# Patient Record
(demographics unavailable — no encounter records)

---

## 2024-10-16 NOTE — ASSESSMENT
[FreeTextEntry1] : holter with symptomatic PVC hold off on further meds for now they are rare  cad on statin atorvastatin 80 mg daily afib repeat 14 day holter monitor  HTN on losartan 50 mg daily  hold losartan morning of surgery take metoprolol morning of and aspirin  fu in three months Medical Necessity Information: It is in your best interest to select a reason for this procedure from the list below. All of these items fulfill various CMS LCD requirements except the new and changing color options. Medical Necessity Clause: This procedure was medically necessary because the lesion that was treated was: Lab: [id number] [facility] Body Location Override (Optional - Billing Will Still Be Based On Selected Body Map Location If Applicable): right ankle Detail Level: Detailed Was A Bandage Applied: Yes Size Of Lesion In Cm (Required): 1.2 X Size Of Lesion In Cm (Optional): 0 Biopsy Method: Personna blade Anesthesia Type: 1% lidocaine with epinephrine Anesthesia Volume In Cc: 0.7 Hemostasis: Electrocautery Wound Care: Bacitracin Path Notes (To The Dermatopathologist): Size: 1. 2\\nR/o DN Consent was obtained from the patient. The risks and benefits to therapy were discussed in detail. Specifically, the risks of infection, scarring, bleeding, prolonged wound healing, incomplete removal, allergy to anesthesia, nerve injury and recurrence were addressed. Prior to the procedure, the treatment site was clearly identified and confirmed by the patient. All components of Universal Protocol/PAUSE Rule completed. Render Post-Care Instructions In Note?: no Post-Care Instructions: I reviewed with the patient in detail post-care instructions. Patient is to keep the biopsy site dry overnight, and then apply bacitracin twice daily until healed. Patient may apply hydrogen peroxide soaks to remove any crusting. Notification Instructions: Patient will be notified of pathology results. However, patient instructed to call the office if not contacted within 2 weeks. Billing Type: [employer]

## 2024-10-16 NOTE — PHYSICAL EXAM
[Well Developed] : well developed [Well Nourished] : well nourished [No Acute Distress] : no acute distress [Normal Venous Pressure] : normal venous pressure [No Carotid Bruit] : no carotid bruit [Normal S1, S2] : normal S1, S2 [No Murmur] : no murmur [No Rub] : no rub [No Gallop] : no gallop [Clear Lung Fields] : clear lung fields [Good Air Entry] : good air entry [No Respiratory Distress] : no respiratory distress  [Soft] : abdomen soft [Non Tender] : non-tender [No Masses/organomegaly] : no masses/organomegaly [Normal Bowel Sounds] : normal bowel sounds [Normal Gait] : normal gait [No Edema] : no edema [No Cyanosis] : no cyanosis [No Clubbing] : no clubbing [No Varicosities] : no varicosities [No Rash] : no rash [No Skin Lesions] : no skin lesions [Moves all extremities] : moves all extremities [No Focal Deficits] : no focal deficits [Normal Speech] : normal speech [Alert and Oriented] : alert and oriented [Normal memory] : normal memory [General Appearance - Well Developed] : well developed [Normal Appearance] : normal appearance [Well Groomed] : well groomed [General Appearance - Well Nourished] : well nourished [No Deformities] : no deformities [General Appearance - In No Acute Distress] : no acute distress [Normal Conjunctiva] : the conjunctiva exhibited no abnormalities [Eyelids - No Xanthelasma] : the eyelids demonstrated no xanthelasmas [Normal Oral Mucosa] : normal oral mucosa [No Oral Pallor] : no oral pallor [No Oral Cyanosis] : no oral cyanosis [Normal Jugular Venous A Waves Present] : normal jugular venous A waves present [Normal Jugular Venous V Waves Present] : normal jugular venous V waves present [No Jugular Venous Restrepo A Waves] : no jugular venous restrepo A waves [Respiration, Rhythm And Depth] : normal respiratory rhythm and effort [Exaggerated Use Of Accessory Muscles For Inspiration] : no accessory muscle use [Auscultation Breath Sounds / Voice Sounds] : lungs were clear to auscultation bilaterally [Heart Rate And Rhythm] : heart rate and rhythm were normal [Heart Sounds] : normal S1 and S2 [Murmurs] : no murmurs present [Abdomen Soft] : soft [Abdomen Tenderness] : non-tender [Abdomen Mass (___ Cm)] : no abdominal mass palpated [Abnormal Walk] : normal gait [Gait - Sufficient For Exercise Testing] : the gait was sufficient for exercise testing [Nail Clubbing] : no clubbing of the fingernails [Cyanosis, Localized] : no localized cyanosis [Petechial Hemorrhages (___cm)] : no petechial hemorrhages [Skin Color & Pigmentation] : normal skin color and pigmentation [] : no rash [No Venous Stasis] : no venous stasis [Skin Lesions] : no skin lesions [No Skin Ulcers] : no skin ulcer [No Xanthoma] : no  xanthoma was observed [Oriented To Time, Place, And Person] : oriented to person, place, and time [Affect] : the affect was normal [Mood] : the mood was normal [No Anxiety] : not feeling anxious

## 2024-10-16 NOTE — HISTORY OF PRESENT ILLNESS
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

## 2024-10-16 NOTE — PROCEDURE
[Normal] : Normal ECG response to treadmill exercise. [de-identified] : Darek [de-identified] : Chest pain [de-identified] : 58 [de-identified] : 110/74 [de-identified] : 7:19 [de-identified] : 115 [de-identified] : 150/70 [de-identified] : 9.2 METS; 75 [TWNoteComboBox1] : DUSTIN [TWNoteComboBox2] : Anton [TWNoteComboBox4] : fatigue [TWNoteComboBox3] : 3 [TWNoteComboBox5] : chest pain

## 2024-11-26 NOTE — PHYSICAL EXAM
[Well Developed] : well developed [Well Nourished] : well nourished [No Acute Distress] : no acute distress [Normal Conjunctiva] : normal conjunctiva [Normal Venous Pressure] : normal venous pressure [No Carotid Bruit] : no carotid bruit [Normal S1, S2] : normal S1, S2 [No Murmur] : no murmur [No Rub] : no rub [No Gallop] : no gallop [Clear Lung Fields] : clear lung fields [Good Air Entry] : good air entry [No Respiratory Distress] : no respiratory distress  [Soft] : abdomen soft [Non Tender] : non-tender [No Masses/organomegaly] : no masses/organomegaly [Normal Bowel Sounds] : normal bowel sounds [Normal Gait] : normal gait [No Edema] : no edema [No Cyanosis] : no cyanosis [No Clubbing] : no clubbing [No Varicosities] : no varicosities [No Rash] : no rash [No Skin Lesions] : no skin lesions [Moves all extremities] : moves all extremities [No Focal Deficits] : no focal deficits [Normal Speech] : normal speech [Alert and Oriented] : alert and oriented [Normal memory] : normal memory [General Appearance - Well Developed] : well developed [Normal Appearance] : normal appearance [Well Groomed] : well groomed [General Appearance - Well Nourished] : well nourished [No Deformities] : no deformities [General Appearance - In No Acute Distress] : no acute distress [Clean] : clean [Dry] : dry [Healing Well] : healing well [FreeTextEntry1] : L anterior chest

## 2024-11-26 NOTE — ADDENDUM
[FreeTextEntry1] : I, Anusha Portillo, am scribing for and the presence of Dr. Guajardo the following sections: HPI, PMH,Family/social history, ROS, Physical Exam, Assessment / Plan.   I, Manan Guajardo, personally performed the services described in the documentation, reviewed the documentation recorded by the scribe in my presence and it accurately and completely records my words and actions.

## 2024-11-26 NOTE — HISTORY OF PRESENT ILLNESS
[FreeTextEntry1] : Ms. 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  She underwent ILR implant (7/11/2024) for AF monitoring.   Now s/p re op with skin grafting on 10/17/24.  Readmitted to [facility] with norovirus and found to have PE's.  Started on Eliquis 11/11/24.  She denies any awareness of rapid/irregular heart action.  Denies any bleeding issues on Eliquis.

## 2024-11-26 NOTE — DISCUSSION/SUMMARY
[de-identified] : ILR function normal  [FreeTextEntry1] : Ms. Davidson is a pleasant 69 year-old female with a past medical history significant for retroperitoneal sarcoma breast DCIS, JOYCE, HTN, pericardial cyst vs prominent pericardial recess, CAD, Hereditary Prothrombin Mutation, recurrent sarcoma s/p iliac right reconstruction with ileocecal resection with primary anastamosis [facility] which was complicated by paroxysmal atrial fibrillation in the setting of anemia 4/2024.  She underwent ILR implant (7/11/2024) for AF monitoring.   1.  Atrial fibrillation Recurrent AF noted on interrogation 10/22/24 - duration 1 h 4 min with AVR 94 bpm Continue Metoprolol for rate control May consider AAD therapy vs ablation pending clinical progress but overall burden is low at this point  2. Stroke Risk CHADS VASc at least 3 Continue Eliquis for TE/CVA ppx  3.  ILR The device is functioning appropriately as programmed and all measured data is WNL.  The patient is enrolled in remote monitoring and was asked to return for follow-up in six months.  Advised to call with any questions or concerns in the interim.

## 2024-11-26 NOTE — DISCUSSION/SUMMARY
[de-identified] : ILR function normal  [FreeTextEntry1] : Ms. 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  She underwent ILR implant (7/11/2024) for AF monitoring.   1.  Atrial fibrillation Recurrent AF noted on interrogation 10/22/24 - duration 1 h 4 min with AVR 94 bpm Continue Metoprolol for rate control May consider AAD therapy vs ablation pending clinical progress but overall burden is low at this point  2. Stroke Risk CHADS VASc at least 3 Continue Eliquis for TE/CVA ppx  3.  ILR The device is functioning appropriately as programmed and all measured data is WNL.  The patient is enrolled in remote monitoring and was asked to return for follow-up in six months.  Advised to call with any questions or concerns in the interim.

## 2024-11-26 NOTE — HISTORY OF PRESENT ILLNESS
[FreeTextEntry1] : Ms. Davidson is a pleasant 69 year-old female with a past medical history significant for retroperitoneal sarcoma breast DCIS, JOYCE, HTN, pericardial cyst vs prominent pericardial recess, CAD, Hereditary Prothrombin Mutation, recurrent sarcoma s/p iliac right reconstruction with ileocecal resection with primary anastamosis [facility] which was complicated by paroxysmal atrial fibrillation in the setting of anemia 4/2024.  She underwent ILR implant (7/11/2024) for AF monitoring.   Now s/p re op with skin grafting on 10/17/24.  Readmitted to [facility] with norovirus and found to have PE's.  Started on Eliquis 11/11/24.  She denies any awareness of rapid/irregular heart action.  Denies any bleeding issues on Eliquis.

## 2024-11-26 NOTE — DISCUSSION/SUMMARY
[de-identified] : ILR function normal  [FreeTextEntry1] : Ms. Davidson is a pleasant 69 year-old female with a past medical history significant for retroperitoneal sarcoma breast DCIS, JOYCE, HTN, pericardial cyst vs prominent pericardial recess, CAD, Hereditary Prothrombin Mutation, recurrent sarcoma s/p iliac right reconstruction with ileocecal resection with [place] [facility] which was complicated by paroxysmal atrial fibrillation in the setting of anemia 4/2024.  She underwent ILR implant (7/11/2024) for AF monitoring.   1.  Atrial fibrillation Recurrent AF noted on interrogation 10/22/24 - duration 1 h 4 min with AVR 94 bpm Continue Metoprolol for rate control May consider AAD therapy vs ablation pending clinical progress but overall burden is low at this point  2. Stroke Risk CHADS VASc at least 3 Continue Eliquis for TE/CVA ppx  3.  ILR The device is functioning appropriately as programmed and all measured data is WNL.  The patient is enrolled in remote monitoring and was asked to return for follow-up in six months.  Advised to call with any questions or concerns in the interim.

## 2024-11-26 NOTE — HISTORY OF PRESENT ILLNESS
[FreeTextEntry1] : Ms. 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  She underwent ILR implant (7/11/2024) for AF monitoring.   Now s/p re op with skin grafting on 10/17/24.  Readmitted to [facility] with norovirus and found to have PE's.  Started on Eliquis 11/11/24.  She denies any awareness of rapid/irregular heart action.  Denies any bleeding issues on Eliquis.

## 2025-01-22 NOTE — HISTORY OF PRESENT ILLNESS
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 She had norovirus Octover 20, 2024 for and found to have pulmonary embolism. Noted to have AF on her Loop recorder around her Norovirus/pulmonary embolism 10/22/2024. SHe started on Doxyl feels tired.    ecg nsr nsst changes   1/22/25 echo 4/2024 [facility] EF normal  CCTA 1/2023 380 FFR negative EST 1/2023 METS 9.2 Normal Echo EF normal PASP 40 mmHg 11/25/24 [facility]

## 2025-01-22 NOTE — PROCEDURE
[Normal] : Normal ECG response to treadmill exercise. [de-identified] : Darek [de-identified] : Chest pain [de-identified] : 58 [de-identified] : 110/74 [de-identified] : 7:19 [de-identified] : 115 [de-identified] : 150/70 [de-identified] : 9.2 METS; 75 [TWNoteComboBox1] : DUSTIN [TWNoteComboBox2] : Anton [TWNoteComboBox4] : fatigue [TWNoteComboBox3] : 3 [TWNoteComboBox5] : chest pain

## 2025-01-22 NOTE — ASSESSMENT
[FreeTextEntry1] : holter with symptomatic PVC hold off on further meds for now they are rare  cad on statin atorvastatin 80 mg daily she is on eliquis now for PE she has an increased chance for recurrence we can keep her on Eliquis current dose as long as no bleeding  HTN on losartan 50 mg daily  fu in three months

## 2025-01-22 NOTE — REASON FOR VISIT
[Coronary Artery Disease] : coronary artery disease [Follow-Up - Clinic] : a clinic follow-up of [FreeTextEntry3] : Dr Brooks  [FreeTextEntry2] : shortness of breath

## 2025-03-06 NOTE — PROCEDURE
[Normal] : Normal ECG response to treadmill exercise. [de-identified] : Darek [de-identified] : Chest pain [de-identified] : 58 [de-identified] : 110/74 [de-identified] : 7:19 [de-identified] : 115 [de-identified] : 150/70 [de-identified] : 9.2 METS; 75 [TWNoteComboBox1] : DUSTIN [TWNoteComboBox2] : Anton [TWNoteComboBox4] : fatigue [TWNoteComboBox3] : 3 [TWNoteComboBox5] : chest pain

## 2025-03-06 NOTE — ASSESSMENT
[FreeTextEntry1] : holter with symptomatic PVC hold off on further meds for now they are rare  cad on statin atorvastatin 80 mg daily she is on eliquis now for PE she has an increased chance for recurrence we can keep her on Eliquis current dose as long as no bleeding  HTN inc losartan to 100 mg daily will do ABPM fu in three months

## 2025-03-06 NOTE — HISTORY OF PRESENT ILLNESS
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 She had norovirus Octover 20, 2024 for and found to have pulmonary embolism. Noted to have AF on her Loop recorder around her Norovirus/pulmonary embolism 10/22/2024.   on Doxyl and dacarbazine labile bp up to 180's at chemo    ecg nsr nsst changes    3/6/25 echo 4/2024 [facility] [place] normal  CCTA 1/2023 380 FFR negative EST 1/2023 METS 9.2 Normal Echo EF normal PASP 40 mmHg 11/25/24 [facility]

## 2025-03-06 NOTE — HISTORY OF PRESENT ILLNESS
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 She had norovirus Octover 20, 2024 for and found to have pulmonary embolism. Noted to have AF on her Loop recorder around her Norovirus/pulmonary embolism 10/22/2024.   on Doxyl and dacarbazine labile bp up to 180's at chemo    ecg nsr nsst changes    3/6/25 echo 4/2024 [facility] EF normal  CCTA 1/2023 380 FFR negative EST 1/2023 METS 9.2 Normal Echo EF normal PASP 40 mmHg 11/25/24 [facility]

## 2025-03-06 NOTE — PROCEDURE
[Normal] : Normal ECG response to treadmill exercise. [de-identified] : Darek [de-identified] : Chest pain [de-identified] : 58 [de-identified] : 110/74 [de-identified] : 7:19 [de-identified] : 115 [de-identified] : 150/70 [de-identified] : 9.2 METS; 75 [TWNoteComboBox1] : DUSTIN [TWNoteComboBox2] : Anton [TWNoteComboBox4] : fatigue [TWNoteComboBox3] : 3 [TWNoteComboBox5] : chest pain

## 2025-03-06 NOTE — CC
Discontinue Regimen: Triamcinolone as directed \\nClobetasol Scalp solution as directed Treatment 2: Methotrexate Action 4: Continue Action 2: Discontinue Detail Level: Zone Treatment 1: Otezla 30mg Action 3: Start Treatment 3: Cosentyx 300 mg injected every 4 weeks [DrVivienne  ___] : Dr. OSEI

## 2025-05-20 NOTE — DISCUSSION/SUMMARY
[de-identified] : ILR function normal  [FreeTextEntry1] : Ms. Davidson is a pleasant 69 year-old female with a past medical history significant for retroperitoneal sarcoma breast DCIS, JOYCE, HTN, pericardial cyst vs prominent pericardial recess, CAD, Hereditary Prothrombin Mutation, recurrent sarcoma s/p iliac right reconstruction with ileocecal resection with [place] [facility] [place] complicated by paroxysmal atrial fibrillation in the setting of anemia 4/2024.  She underwent ILR implant (7/11/2024) for AF monitoring.   1.  Atrial fibrillation Paroxysmal AF noted on ILR; she appears to be unaware and overall burden remains low Continue Metoprolol for rate control  2. Stroke Risk CHADS VASc at least 3 Continue Eliquis for TE/CVA ppx  3.  ILR The device is functioning appropriately as programmed and all measured data is WNL.  The patient is enrolled in remote monitoring and was asked to return for follow-up in six months.  Advised to call with any questions or concerns in the interim.

## 2025-05-20 NOTE — HISTORY OF PRESENT ILLNESS
[FreeTextEntry1] : Ms. Davidson is a pleasant 69 year-old female with a past medical history significant for retroperitoneal sarcoma breast DCIS, JOYCE, HTN, pericardial cyst vs prominent pericardial recess, CAD, Hereditary Prothrombin Mutation, recurrent sarcoma s/p iliac right reconstruction with ileocecal resection with primary anastamosis [facility] which was complicated by paroxysmal atrial fibrillation in the setting of anemia 4/2024.  She underwent ILR implant (7/11/2024) for AF monitoring.   Now s/p re op with skin grafting on 10/17/24.  Readmitted to [facility] with norovirus and found to have PE's.  Started on Eliquis 11/11/24.  She reports recurrent CA in the pelvis and is being treated at [facility].    She denies any awareness of rapid/irregular heart action.  Denies any bleeding issues on Eliquis.

## 2025-05-22 NOTE — HISTORY OF PRESENT ILLNESS
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 c/b Afib in the setting of anemia 4/2024 hematoma s/p evacuation ILR Pulmonary Embolism sleep apnea Heterozygous PT Gene mutation   October 17 2024 repair fo her anastomosis. She had norovirus October 20, 2024 for and found to have pulmonary embolism. Noted to have AF on her Loop recorder around her Norovirus/pulmonary embolism 10/22/2024.   on Doxyl and dacarbazine labile bp up to 180's at chemo   she is planning on  having pelvic mass, hernia repair and remove the hematoma she feels well has had episodes of AF on loop recorder    ecg nsr nsst changes   5/21/25 echo 4/2024 [facility] EF normal  CCTA 1/2023 380 FFR negative EST 1/2023 METS 9.2 Normal Echo EF normal PASP 40 mmHg 11/25/24 [facility]

## 2025-05-22 NOTE — PROCEDURE
[Normal] : Normal ECG response to treadmill exercise. [de-identified] : Darek [de-identified] : Chest pain [de-identified] : 58 [de-identified] : 110/74 [de-identified] : 7:19 [de-identified] : 115 [de-identified] : 150/70 [de-identified] : 9.2 METS; 75 [TWNoteComboBox1] : DUSTIN [TWNoteComboBox2] : Anton [TWNoteComboBox4] : fatigue [TWNoteComboBox3] : 3 [TWNoteComboBox5] : chest pain

## 2025-05-22 NOTE — PROCEDURE
[Normal] : Normal ECG response to treadmill exercise. [de-identified] : Darek [de-identified] : Chest pain [de-identified] : 58 [de-identified] : 110/74 [de-identified] : 7:19 [de-identified] : 115 [de-identified] : 150/70 [de-identified] : 9.2 METS; 75 [TWNoteComboBox1] : DUSTIN [TWNoteComboBox2] : Anton [TWNoteComboBox4] : fatigue [TWNoteComboBox3] : 3 [TWNoteComboBox5] : chest pain

## 2025-05-22 NOTE — HISTORY OF PRESENT ILLNESS
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 She had norovirus October 20, 2024 for and found to have pulmonary embolism. Noted to have AF on her Loop recorder around her Norovirus/pulmonary embolism 10/22/2024.   on Doxyl and dacarbazine labile bp up to 180's at chemo   she is planning on  having pelvic mass, hernia repair and remove the hematoma she feels well has had episodes of AF on loop recorder    ecg nsr nsst changes   5/21/25 echo 4/2024 [facility] EF normal  CCTA 1/2023 380 FFR negative EST 1/2023 METS 9.2 Normal Echo EF normal PASP 40 mmHg 11/25/24 [facility]

## 2025-05-22 NOTE — ASSESSMENT
[FreeTextEntry1] : - CAD on atorvastatin 80 mg daily  - HTN on losartan 100 mgdaily  - AF on eliquis 5 mg bid and toprol xl 50 mg daimable  - she is optimized from a cardiac standpoint for surgery - can hold losartan day of surgery take toprol xl 50 mg in am of surgery - would recc she be bridged with lovenox given her AF and thromboembolism history (chadsvasc 6) - fu after surgery